# Patient Record
Sex: MALE
[De-identification: names, ages, dates, MRNs, and addresses within clinical notes are randomized per-mention and may not be internally consistent; named-entity substitution may affect disease eponyms.]

---

## 2019-11-06 PROBLEM — Z00.00 ENCOUNTER FOR PREVENTIVE HEALTH EXAMINATION: Status: ACTIVE | Noted: 2019-11-06

## 2019-12-04 ENCOUNTER — APPOINTMENT (OUTPATIENT)
Dept: OTOLARYNGOLOGY | Facility: CLINIC | Age: 24
End: 2019-12-04
Payer: SELF-PAY

## 2019-12-04 VITALS
BODY MASS INDEX: 24.64 KG/M2 | HEIGHT: 67 IN | SYSTOLIC BLOOD PRESSURE: 121 MMHG | DIASTOLIC BLOOD PRESSURE: 80 MMHG | WEIGHT: 157 LBS

## 2019-12-04 DIAGNOSIS — H61.23 IMPACTED CERUMEN, BILATERAL: ICD-10-CM

## 2019-12-04 PROCEDURE — 99202 OFFICE O/P NEW SF 15 MIN: CPT | Mod: 25

## 2019-12-04 PROCEDURE — 69210 REMOVE IMPACTED EAR WAX UNI: CPT

## 2019-12-04 NOTE — HISTORY OF PRESENT ILLNESS
[de-identified] : Pt here today as a new patient accompanied by his aid for b/l clogged ears. no pain. Patient is a home resident.

## 2019-12-04 NOTE — PHYSICAL EXAM
[de-identified] : bilateral impacted wax cleaned with curette [Midline] : trachea located in midline position [Normal] : no rashes